# Patient Record
Sex: MALE | Race: OTHER | Employment: FULL TIME | URBAN - METROPOLITAN AREA
[De-identification: names, ages, dates, MRNs, and addresses within clinical notes are randomized per-mention and may not be internally consistent; named-entity substitution may affect disease eponyms.]

---

## 2018-02-16 ENCOUNTER — APPOINTMENT (OUTPATIENT)
Dept: OCCUPATIONAL MEDICINE | Age: 58
End: 2018-02-16
Attending: PEDIATRICS

## 2018-02-20 ENCOUNTER — HOSPITAL ENCOUNTER (OUTPATIENT)
Dept: GENERAL RADIOLOGY | Age: 58
Discharge: HOME | End: 2018-02-20
Attending: PREVENTIVE MEDICINE

## 2018-02-20 ENCOUNTER — OFFICE VISIT (OUTPATIENT)
Dept: OCCUPATIONAL MEDICINE | Age: 58
End: 2018-02-20
Attending: FAMILY MEDICINE

## 2018-02-20 DIAGNOSIS — M54.2 CERVICAL PAIN: Primary | ICD-10-CM

## 2018-02-20 DIAGNOSIS — M25.512 ACUTE PAIN OF LEFT SHOULDER: ICD-10-CM

## 2018-02-20 DIAGNOSIS — M54.2 CERVICAL PAIN: ICD-10-CM

## 2018-02-20 PROCEDURE — 73030 X-RAY EXAM OF SHOULDER: CPT | Performed by: PREVENTIVE MEDICINE

## 2018-02-20 PROCEDURE — 72050 X-RAY EXAM NECK SPINE 4/5VWS: CPT | Performed by: PREVENTIVE MEDICINE

## 2018-02-22 ENCOUNTER — APPOINTMENT (OUTPATIENT)
Dept: OCCUPATIONAL MEDICINE | Age: 58
End: 2018-02-22
Attending: FAMILY MEDICINE

## 2018-02-27 ENCOUNTER — APPOINTMENT (OUTPATIENT)
Dept: OCCUPATIONAL MEDICINE | Age: 58
End: 2018-02-27
Attending: FAMILY MEDICINE

## 2018-03-05 ENCOUNTER — OFFICE VISIT (OUTPATIENT)
Dept: PHYSICAL THERAPY | Age: 58
End: 2018-03-05
Attending: FAMILY MEDICINE

## 2018-03-05 ENCOUNTER — TELEPHONE (OUTPATIENT)
Dept: PHYSICAL THERAPY | Age: 58
End: 2018-03-05

## 2018-03-05 NOTE — PROGRESS NOTES
OCCUPATIONAL HEALTH PHYSICAL THERAPY EVALUATION:   Referring Physician: Dr. Bren Terry  Diagnosis: Radiculopathy of arm (M54.10)  Left shoulder strain (J18.926D)     Date of Service: 3/5/2018   Date of onset/injury: 2/15/2018 No. Of Authorized visits: Lifting   (% of workday)     40  pounds   30   pounds   10  pounds          Physical Demand Characteristics of Work  Physical Demand Level Occasional   0-33% of workday Frequent  34-66% of workday Constant  % of workday   Medium  10 lbs   10 lb posture, HEP. Patient was instructed in and issued a HEP.   Received therapeutic exercises:  - supine neck retraction, rotation  - seated neck retraction, rotation, lateral flexion  - seated postural correction / scap retractions  - standing behind the ba care.    X___________________________________________________ Date____________________    Certification From: 8/9/7409  To:6/3/2018

## 2018-03-06 ENCOUNTER — APPOINTMENT (OUTPATIENT)
Dept: OCCUPATIONAL MEDICINE | Age: 58
End: 2018-03-06
Attending: FAMILY MEDICINE

## 2018-03-07 ENCOUNTER — OFFICE VISIT (OUTPATIENT)
Dept: PHYSICAL THERAPY | Age: 58
End: 2018-03-07
Attending: FAMILY MEDICINE

## 2018-03-07 NOTE — PROGRESS NOTES
Diagnosis: Radiculopathy of arm (M54.10)  Left shoulder strain (T12.436Y)          # of Visits:  2         Next MD visit: none scheduled  Fall Risk: standard         Precautions: n/a          Medication Changes since last visit?: No    Subjective: States h

## 2018-03-12 ENCOUNTER — OFFICE VISIT (OUTPATIENT)
Dept: PHYSICAL THERAPY | Age: 58
End: 2018-03-12
Attending: FAMILY MEDICINE

## 2018-03-12 NOTE — PROGRESS NOTES
Diagnosis: Radiculopathy of arm (M54.10)  Left shoulder strain (L81.283A)          # of Visits:  3         Next MD visit: none scheduled  Fall Risk: standard         Precautions: n/a          Medication Changes since last visit?: No    Subjective: Still co

## 2018-03-13 ENCOUNTER — APPOINTMENT (OUTPATIENT)
Dept: OCCUPATIONAL MEDICINE | Age: 58
End: 2018-03-13
Attending: FAMILY MEDICINE

## 2018-03-14 ENCOUNTER — APPOINTMENT (OUTPATIENT)
Dept: PHYSICAL THERAPY | Age: 58
End: 2018-03-14
Attending: FAMILY MEDICINE

## 2018-03-16 ENCOUNTER — OFFICE VISIT (OUTPATIENT)
Dept: PHYSICAL THERAPY | Age: 58
End: 2018-03-16
Attending: FAMILY MEDICINE

## 2018-03-16 NOTE — PROGRESS NOTES
Patient saw MD yesterday and was referred for MRI and ortho consult due to persistent neck and shoulder pain. PT on hold for now until ortho consult next week.

## 2018-03-17 ENCOUNTER — HOSPITAL ENCOUNTER (OUTPATIENT)
Dept: MRI IMAGING | Age: 58
Discharge: HOME | End: 2018-03-17
Attending: FAMILY MEDICINE
Payer: OTHER MISCELLANEOUS

## 2018-03-17 DIAGNOSIS — S46.919A SHOULDER STRAIN: ICD-10-CM

## 2018-03-17 DIAGNOSIS — M54.10 RADICULOPATHY OF ARM: ICD-10-CM

## 2018-03-17 PROCEDURE — 73221 MRI JOINT UPR EXTREM W/O DYE: CPT | Performed by: FAMILY MEDICINE

## 2018-03-19 ENCOUNTER — APPOINTMENT (OUTPATIENT)
Dept: PHYSICAL THERAPY | Age: 58
End: 2018-03-19
Attending: FAMILY MEDICINE

## 2018-06-30 ENCOUNTER — HOSPITAL ENCOUNTER (OUTPATIENT)
Dept: GENERAL RADIOLOGY | Age: 58
Discharge: HOME | End: 2018-06-30
Attending: FAMILY MEDICINE
Payer: OTHER MISCELLANEOUS

## 2018-06-30 DIAGNOSIS — F17.200 SMOKER: ICD-10-CM

## 2018-06-30 PROCEDURE — 71046 X-RAY EXAM CHEST 2 VIEWS: CPT | Performed by: FAMILY MEDICINE

## 2018-06-30 RX ORDER — IBUPROFEN 800 MG
TABLET ORAL DAILY
COMMUNITY

## 2018-06-30 RX ORDER — LISINOPRIL 10 MG/1
10 TABLET ORAL DAILY
COMMUNITY

## 2018-07-10 ENCOUNTER — HOSPITAL ENCOUNTER (OUTPATIENT)
Facility: HOSPITAL | Age: 58
Setting detail: HOSPITAL OUTPATIENT SURGERY
Discharge: HOME | End: 2018-07-10
Attending: ORTHOPAEDIC SURGERY | Admitting: ORTHOPAEDIC SURGERY
Payer: OTHER MISCELLANEOUS

## 2018-07-10 ENCOUNTER — ANESTHESIA EVENT (OUTPATIENT)
Dept: SURGERY | Facility: HOSPITAL | Age: 58
End: 2018-07-10
Payer: OTHER MISCELLANEOUS

## 2018-07-10 ENCOUNTER — ANESTHESIA (OUTPATIENT)
Dept: SURGERY | Facility: HOSPITAL | Age: 58
End: 2018-07-10
Payer: OTHER MISCELLANEOUS

## 2018-07-10 VITALS
SYSTOLIC BLOOD PRESSURE: 140 MMHG | HEIGHT: 68 IN | BODY MASS INDEX: 24.55 KG/M2 | HEART RATE: 55 BPM | OXYGEN SATURATION: 96 % | RESPIRATION RATE: 16 BRPM | DIASTOLIC BLOOD PRESSURE: 78 MMHG | TEMPERATURE: 97 F | WEIGHT: 162 LBS

## 2018-07-10 PROBLEM — M75.102 ROTATOR CUFF TEAR, LEFT: Status: ACTIVE | Noted: 2018-07-10

## 2018-07-10 PROCEDURE — 99152 MOD SED SAME PHYS/QHP 5/>YRS: CPT | Performed by: ORTHOPAEDIC SURGERY

## 2018-07-10 PROCEDURE — 0RBK4ZZ EXCISION OF LEFT SHOULDER JOINT, PERCUTANEOUS ENDOSCOPIC APPROACH: ICD-10-PCS | Performed by: ORTHOPAEDIC SURGERY

## 2018-07-10 PROCEDURE — 0RNK4ZZ RELEASE LEFT SHOULDER JOINT, PERCUTANEOUS ENDOSCOPIC APPROACH: ICD-10-PCS | Performed by: ORTHOPAEDIC SURGERY

## 2018-07-10 PROCEDURE — 0LQ24ZZ REPAIR LEFT SHOULDER TENDON, PERCUTANEOUS ENDOSCOPIC APPROACH: ICD-10-PCS | Performed by: ORTHOPAEDIC SURGERY

## 2018-07-10 PROCEDURE — 64415 NJX AA&/STRD BRCH PLXS IMG: CPT | Performed by: ORTHOPAEDIC SURGERY

## 2018-07-10 PROCEDURE — 76942 ECHO GUIDE FOR BIOPSY: CPT | Performed by: ORTHOPAEDIC SURGERY

## 2018-07-10 PROCEDURE — 3E0T3BZ INTRODUCTION OF ANESTHETIC AGENT INTO PERIPHERAL NERVES AND PLEXI, PERCUTANEOUS APPROACH: ICD-10-PCS | Performed by: ANESTHESIOLOGY

## 2018-07-10 DEVICE — ANCHOR CORK METAL AR-1928SF-45: Type: IMPLANTABLE DEVICE | Site: SHOULDER | Status: FUNCTIONAL

## 2018-07-10 RX ORDER — HYDROMORPHONE HYDROCHLORIDE 1 MG/ML
0.6 INJECTION, SOLUTION INTRAMUSCULAR; INTRAVENOUS; SUBCUTANEOUS EVERY 5 MIN PRN
Status: DISCONTINUED | OUTPATIENT
Start: 2018-07-10 | End: 2018-07-10

## 2018-07-10 RX ORDER — CEFAZOLIN SODIUM/WATER 2 G/20 ML
2 SYRINGE (ML) INTRAVENOUS ONCE
Status: DISCONTINUED | OUTPATIENT
Start: 2018-07-10 | End: 2018-07-10 | Stop reason: HOSPADM

## 2018-07-10 RX ORDER — OXYCODONE HYDROCHLORIDE AND ACETAMINOPHEN 5; 325 MG/1; MG/1
1 TABLET ORAL EVERY 4 HOURS PRN
Status: DISCONTINUED | OUTPATIENT
Start: 2018-07-10 | End: 2018-07-10

## 2018-07-10 RX ORDER — HYDROCODONE BITARTRATE AND ACETAMINOPHEN 5; 325 MG/1; MG/1
2 TABLET ORAL AS NEEDED
Status: DISCONTINUED | OUTPATIENT
Start: 2018-07-10 | End: 2018-07-10

## 2018-07-10 RX ORDER — DEXAMETHASONE SODIUM PHOSPHATE 4 MG/ML
VIAL (ML) INJECTION AS NEEDED
Status: DISCONTINUED | OUTPATIENT
Start: 2018-07-10 | End: 2018-07-10 | Stop reason: SURG

## 2018-07-10 RX ORDER — HALOPERIDOL 5 MG/ML
0.25 INJECTION INTRAMUSCULAR ONCE AS NEEDED
Status: DISCONTINUED | OUTPATIENT
Start: 2018-07-10 | End: 2018-07-10

## 2018-07-10 RX ORDER — NALOXONE HYDROCHLORIDE 0.4 MG/ML
80 INJECTION, SOLUTION INTRAMUSCULAR; INTRAVENOUS; SUBCUTANEOUS AS NEEDED
Status: DISCONTINUED | OUTPATIENT
Start: 2018-07-10 | End: 2018-07-10

## 2018-07-10 RX ORDER — ROCURONIUM BROMIDE 10 MG/ML
INJECTION, SOLUTION INTRAVENOUS AS NEEDED
Status: DISCONTINUED | OUTPATIENT
Start: 2018-07-10 | End: 2018-07-10 | Stop reason: SURG

## 2018-07-10 RX ORDER — HYDROMORPHONE HYDROCHLORIDE 1 MG/ML
0.2 INJECTION, SOLUTION INTRAMUSCULAR; INTRAVENOUS; SUBCUTANEOUS EVERY 5 MIN PRN
Status: DISCONTINUED | OUTPATIENT
Start: 2018-07-10 | End: 2018-07-10

## 2018-07-10 RX ORDER — DEXAMETHASONE SODIUM PHOSPHATE 10 MG/ML
INJECTION, SOLUTION INTRAMUSCULAR; INTRAVENOUS AS NEEDED
Status: DISCONTINUED | OUTPATIENT
Start: 2018-07-10 | End: 2018-07-10 | Stop reason: SURG

## 2018-07-10 RX ORDER — SODIUM CHLORIDE, SODIUM LACTATE, POTASSIUM CHLORIDE, CALCIUM CHLORIDE 600; 310; 30; 20 MG/100ML; MG/100ML; MG/100ML; MG/100ML
INJECTION, SOLUTION INTRAVENOUS CONTINUOUS
Status: DISCONTINUED | OUTPATIENT
Start: 2018-07-10 | End: 2018-07-10

## 2018-07-10 RX ORDER — ONDANSETRON 2 MG/ML
4 INJECTION INTRAMUSCULAR; INTRAVENOUS ONCE AS NEEDED
Status: DISCONTINUED | OUTPATIENT
Start: 2018-07-10 | End: 2018-07-10

## 2018-07-10 RX ORDER — LIDOCAINE HYDROCHLORIDE 10 MG/ML
INJECTION, SOLUTION EPIDURAL; INFILTRATION; INTRACAUDAL; PERINEURAL AS NEEDED
Status: DISCONTINUED | OUTPATIENT
Start: 2018-07-10 | End: 2018-07-10 | Stop reason: SURG

## 2018-07-10 RX ORDER — HYDROMORPHONE HYDROCHLORIDE 1 MG/ML
0.4 INJECTION, SOLUTION INTRAMUSCULAR; INTRAVENOUS; SUBCUTANEOUS EVERY 5 MIN PRN
Status: DISCONTINUED | OUTPATIENT
Start: 2018-07-10 | End: 2018-07-10

## 2018-07-10 RX ORDER — ROPIVACAINE HYDROCHLORIDE 5 MG/ML
INJECTION, SOLUTION EPIDURAL; INFILTRATION; PERINEURAL AS NEEDED
Status: DISCONTINUED | OUTPATIENT
Start: 2018-07-10 | End: 2018-07-10 | Stop reason: SURG

## 2018-07-10 RX ORDER — LABETALOL HYDROCHLORIDE 5 MG/ML
INJECTION, SOLUTION INTRAVENOUS AS NEEDED
Status: DISCONTINUED | OUTPATIENT
Start: 2018-07-10 | End: 2018-07-10 | Stop reason: SURG

## 2018-07-10 RX ORDER — HYDROCODONE BITARTRATE AND ACETAMINOPHEN 5; 325 MG/1; MG/1
1 TABLET ORAL AS NEEDED
Status: DISCONTINUED | OUTPATIENT
Start: 2018-07-10 | End: 2018-07-10

## 2018-07-10 RX ORDER — FAMOTIDINE 20 MG/1
20 TABLET ORAL ONCE
Status: DISCONTINUED | OUTPATIENT
Start: 2018-07-10 | End: 2018-07-10 | Stop reason: HOSPADM

## 2018-07-10 RX ORDER — ACETAMINOPHEN 500 MG
1000 TABLET ORAL ONCE
Status: COMPLETED | OUTPATIENT
Start: 2018-07-10 | End: 2018-07-10

## 2018-07-10 RX ORDER — ONDANSETRON 2 MG/ML
INJECTION INTRAMUSCULAR; INTRAVENOUS AS NEEDED
Status: DISCONTINUED | OUTPATIENT
Start: 2018-07-10 | End: 2018-07-10 | Stop reason: SURG

## 2018-07-10 RX ORDER — METOCLOPRAMIDE 10 MG/1
10 TABLET ORAL ONCE
Status: DISCONTINUED | OUTPATIENT
Start: 2018-07-10 | End: 2018-07-10 | Stop reason: HOSPADM

## 2018-07-10 RX ORDER — CEFAZOLIN SODIUM/WATER 2 G/20 ML
SYRINGE (ML) INTRAVENOUS AS NEEDED
Status: DISCONTINUED | OUTPATIENT
Start: 2018-07-10 | End: 2018-07-10 | Stop reason: SURG

## 2018-07-10 RX ORDER — MIDAZOLAM HYDROCHLORIDE 1 MG/ML
INJECTION INTRAMUSCULAR; INTRAVENOUS AS NEEDED
Status: DISCONTINUED | OUTPATIENT
Start: 2018-07-10 | End: 2018-07-10 | Stop reason: SURG

## 2018-07-10 RX ADMIN — LIDOCAINE HYDROCHLORIDE 50 MG: 10 INJECTION, SOLUTION EPIDURAL; INFILTRATION; INTRACAUDAL; PERINEURAL at 12:54:00

## 2018-07-10 RX ADMIN — ONDANSETRON 4 MG: 2 INJECTION INTRAMUSCULAR; INTRAVENOUS at 13:43:00

## 2018-07-10 RX ADMIN — CEFAZOLIN SODIUM/WATER 2 G: 2 G/20 ML SYRINGE (ML) INTRAVENOUS at 13:04:00

## 2018-07-10 RX ADMIN — ROCURONIUM BROMIDE 5 MG: 10 INJECTION, SOLUTION INTRAVENOUS at 12:54:00

## 2018-07-10 RX ADMIN — ROPIVACAINE HYDROCHLORIDE 30 ML: 5 INJECTION, SOLUTION EPIDURAL; INFILTRATION; PERINEURAL at 11:27:00

## 2018-07-10 RX ADMIN — SODIUM CHLORIDE, SODIUM LACTATE, POTASSIUM CHLORIDE, CALCIUM CHLORIDE: 600; 310; 30; 20 INJECTION, SOLUTION INTRAVENOUS at 12:48:00

## 2018-07-10 RX ADMIN — DEXAMETHASONE SODIUM PHOSPHATE 4 MG: 10 INJECTION, SOLUTION INTRAMUSCULAR; INTRAVENOUS at 11:27:00

## 2018-07-10 RX ADMIN — LABETALOL HYDROCHLORIDE 5 MG: 5 INJECTION, SOLUTION INTRAVENOUS at 13:58:00

## 2018-07-10 RX ADMIN — DEXAMETHASONE SODIUM PHOSPHATE 4 MG: 4 MG/ML VIAL (ML) INJECTION at 12:58:00

## 2018-07-10 RX ADMIN — LIDOCAINE HYDROCHLORIDE 3 ML: 10 INJECTION, SOLUTION EPIDURAL; INFILTRATION; INTRACAUDAL; PERINEURAL at 11:25:00

## 2018-07-10 RX ADMIN — MIDAZOLAM HYDROCHLORIDE 2 MG: 1 INJECTION INTRAMUSCULAR; INTRAVENOUS at 11:22:00

## 2018-07-10 NOTE — ANESTHESIA POSTPROCEDURE EVALUATION
Patient: Cecilia Ramesh    Procedure Summary     Date:  07/10/18 Room / Location:  [facility] OR  / [facility] OR    Anesthesia Start:  3716 Anesthesia Stop:  7812    Procedure:  SHOULDER ARTHROSCOPY ROTATOR CUFF REPAIR (Left Groin) Diagnosis:  Shannen Peter

## 2018-07-10 NOTE — ADDENDUM NOTE
Addendum  created 07/10/18 1411 by Steve Khan CRNA    Anesthesia Intra Meds edited, Orders acknowledged in Narrator

## 2018-07-10 NOTE — ANESTHESIA PREPROCEDURE EVALUATION
Anesthesia PreOp Note    HPI:     Ania Garza is a 60year old male who presents for preoperative consultation requested by:  Jasmin Abad MD    Date of Surgery: 7/10/2018    Procedure(s):  SHOULDER ARTHROSCOPY ROTATOR CUFF REPAIR  Indication: [facility] Vital Signs: Body mass index is 24.63 kg/m². height is 1.727 m (5' 8\") and weight is 73.5 kg (162 lb). His oral temperature is 98 °F (36.7 °C). His blood pressure is 153/74 and his pulse is 57. His respiration is 15 and oxygen saturation is 97%.

## 2018-07-10 NOTE — ANESTHESIA PROCEDURE NOTES
Airway  Date/Time: 7/10/2018 12:55 PM  Urgency: elective      General Information and Staff    Patient location during procedure: OR  Anesthesiologist: Yuni Wilson  Resident/CRNA: Christina Lazo  Performed: CRNA     Indications and Patient Condition

## 2018-07-10 NOTE — ANESTHESIA PROCEDURE NOTES
Peripheral Block    Anesthesiologist:  Yuni Wilson  Performed by:   Anesthesiologist  Patient Location:  PACU  Start Time:  7/10/2018 11:21 AM  End Time:  7/10/2018 11:27 AM  Site Identification: ultrasound guided, real time ultrasound guided, nerve stimu

## 2018-07-10 NOTE — H&P
[facility] Patient Status:  Hospital Outpatient Surgery    1960 MRN [id number]   Location [facility] UNIT Attending Rusty Vargas MD   Hosp Day # 0 PCP No zahra

## 2018-07-10 NOTE — BRIEF OP NOTE
Pre-Operative Diagnosis: Rotator cuff tear left shoulder     Post-Operative Diagnosis: Rotator cuff tear left shoulder, Impingement, synovitis     Procedure Performed:   Procedure(s):  Left shoulder arthroscopic rotator cuff repair, subacromial decompressi

## 2018-07-21 NOTE — OPERATIVE REPORT
[facility]    PATIENT'S NAME: Carolyn Sanchez   ATTENDING PHYSICIAN: [address] Miguel Florez MD   OPERATING PHYSICIAN: Leandro Richards.  Miguel Florez MD   PATIENT ACCOUNT#:   [id number]    LOCATION:  [address]  MEDICAL RECORD #:   [id number]       DATE OF administered to the patient for prophylactic purposes. The patient was brought into the operating room, placed on the operating table in supine position. General anesthesia was obtained by the anesthesiologist without difficulty.   The patient was reposit Next, we directed our attention to the greater tuberosity. We debrided the greater tuberosity until there was a bleeding bony surface present. Next, we directed our attention to the rotator cuff tear. It was an L-shaped tear.   We decided to perform mar

## 2018-07-24 ENCOUNTER — OFFICE VISIT (OUTPATIENT)
Dept: PHYSICAL THERAPY | Age: 58
End: 2018-07-24
Payer: OTHER MISCELLANEOUS

## 2018-07-24 DIAGNOSIS — S46.919A: ICD-10-CM

## 2018-07-24 PROCEDURE — 97110 THERAPEUTIC EXERCISES: CPT | Performed by: PHYSICAL THERAPIST

## 2018-07-24 PROCEDURE — 97161 PT EVAL LOW COMPLEX 20 MIN: CPT | Performed by: PHYSICAL THERAPIST

## 2018-07-30 ENCOUNTER — OFFICE VISIT (OUTPATIENT)
Dept: PHYSICAL THERAPY | Age: 58
End: 2018-07-30
Payer: OTHER MISCELLANEOUS

## 2018-07-30 PROCEDURE — 97110 THERAPEUTIC EXERCISES: CPT | Performed by: PHYSICAL THERAPIST

## 2018-07-30 NOTE — PROGRESS NOTES
Diagnosis: L Shoulder Rotator Cuff         Next MD visit: none scheduled  Fall Risk: standard         Precautions: n/a          Medication Changes since last visit?: No    Subjective: States his shoulder hurts when he tires to reach his arm overhead.  Falguni

## 2018-08-01 ENCOUNTER — OFFICE VISIT (OUTPATIENT)
Dept: PHYSICAL THERAPY | Age: 58
End: 2018-08-01
Payer: OTHER MISCELLANEOUS

## 2018-08-01 PROCEDURE — 97110 THERAPEUTIC EXERCISES: CPT

## 2018-08-01 NOTE — PROGRESS NOTES
Diagnosis: L Shoulder Rotator Cuff         Next MD visit: 8/9/18  Fall Risk: standard         Precautions: n/a          Medication Changes since last visit?: No    Subjective: Patient reports 6/10 L shoulder pain today.  Patient states pain increases when h

## 2018-08-06 ENCOUNTER — OFFICE VISIT (OUTPATIENT)
Dept: PHYSICAL THERAPY | Age: 58
End: 2018-08-06
Payer: OTHER MISCELLANEOUS

## 2018-08-06 PROCEDURE — 97110 THERAPEUTIC EXERCISES: CPT

## 2018-08-06 NOTE — PROGRESS NOTES
Diagnosis: L Shoulder Rotator Cuff         Next MD visit: 8/9/18  Fall Risk: standard         Precautions: n/a          Medication Changes since last visit?: No    Subjective: Patient reports 4/10 L shoulder pain today.  Patient reported decreased pain afte

## 2018-08-08 ENCOUNTER — OFFICE VISIT (OUTPATIENT)
Dept: PHYSICAL THERAPY | Age: 58
End: 2018-08-08
Payer: OTHER MISCELLANEOUS

## 2018-08-08 PROCEDURE — 97110 THERAPEUTIC EXERCISES: CPT | Performed by: PHYSICAL THERAPIST

## 2018-08-08 NOTE — PROGRESS NOTES
Physical Therapy Treatment and Progress Report    Patient Name: Tomas Jorgensen, MRN: [id number]  Date: 8/8/2018  Referring Physician: Dr. Terri Smith  Diagnosis: Strain of shoulder (D00.822U) S/P Rotator Cuff Repair    Pt has attended 6 visits deviations. 4. Patient will be able to verbalize and demonstrate strategies to improve posture during activity. Rehab Potential: good    Plan: Continue skilled Physcial Therapy 2 x/week or a total of 10 visits over a 90 day period.  Treatment wi

## 2018-08-13 ENCOUNTER — TELEPHONE (OUTPATIENT)
Dept: PHYSICAL THERAPY | Age: 58
End: 2018-08-13

## 2018-08-13 ENCOUNTER — OFFICE VISIT (OUTPATIENT)
Dept: PHYSICAL THERAPY | Age: 58
End: 2018-08-13
Payer: OTHER MISCELLANEOUS

## 2018-08-13 PROCEDURE — 97110 THERAPEUTIC EXERCISES: CPT

## 2018-08-13 NOTE — PROGRESS NOTES
Diagnosis: L Shoulder Rotator Cuff         Next MD visit: 9/6/18  Fall Risk: standard         Precautions: n/a          Medication Changes since last visit?: No    Subjective: Patient reports 4/10 L shoulder pain today.  Patient saw MD last Thursday and to

## 2018-08-16 ENCOUNTER — OFFICE VISIT (OUTPATIENT)
Dept: PHYSICAL THERAPY | Age: 58
End: 2018-08-16
Payer: OTHER MISCELLANEOUS

## 2018-08-16 PROCEDURE — 97110 THERAPEUTIC EXERCISES: CPT | Performed by: PHYSICAL THERAPIST

## 2018-08-16 NOTE — PROGRESS NOTES
Diagnosis: L Shoulder Rotator Cuff         Next MD visit: 9/6/18  Fall Risk: standard         Precautions: n/a          Medication Changes since last visit?: No    Subjective: Patient reports 0/10 L shoulder pain when at rest. States he has been staying in

## 2018-08-20 ENCOUNTER — APPOINTMENT (OUTPATIENT)
Dept: PHYSICAL THERAPY | Age: 58
End: 2018-08-20
Payer: OTHER MISCELLANEOUS

## 2018-08-23 ENCOUNTER — OFFICE VISIT (OUTPATIENT)
Dept: PHYSICAL THERAPY | Age: 58
End: 2018-08-23
Attending: ORTHOPAEDIC SURGERY
Payer: OTHER MISCELLANEOUS

## 2018-08-23 PROCEDURE — 97110 THERAPEUTIC EXERCISES: CPT

## 2018-08-23 NOTE — PROGRESS NOTES
Diagnosis: L Shoulder Rotator Cuff         Next MD visit: 9/6/18  Fall Risk: standard         Precautions: n/a          Medication Changes since last visit?: No    Subjective: Patient reports 3/10 L shoulder pain when at rest. States he has been staying in

## 2018-08-27 ENCOUNTER — APPOINTMENT (OUTPATIENT)
Dept: PHYSICAL THERAPY | Age: 58
End: 2018-08-27
Attending: ORTHOPAEDIC SURGERY
Payer: OTHER MISCELLANEOUS

## 2018-08-29 ENCOUNTER — OFFICE VISIT (OUTPATIENT)
Dept: PHYSICAL THERAPY | Age: 58
End: 2018-08-29
Attending: ORTHOPAEDIC SURGERY
Payer: OTHER MISCELLANEOUS

## 2018-08-29 PROCEDURE — 97110 THERAPEUTIC EXERCISES: CPT | Performed by: PHYSICAL THERAPIST

## 2018-08-29 NOTE — PROGRESS NOTES
Diagnosis: L Shoulder Rotator Cuff         Next MD visit: 9/6/18  Fall Risk: standard         Precautions: n/a          Medication Changes since last visit?: No    Subjective: Patient reports 2/10 L shoulder pain when at rest.       Objective:    L shoulde

## 2018-09-04 ENCOUNTER — OFFICE VISIT (OUTPATIENT)
Dept: PHYSICAL THERAPY | Age: 58
End: 2018-09-04
Attending: ORTHOPAEDIC SURGERY
Payer: OTHER MISCELLANEOUS

## 2018-09-04 PROCEDURE — 97110 THERAPEUTIC EXERCISES: CPT

## 2018-09-06 ENCOUNTER — APPOINTMENT (OUTPATIENT)
Dept: PHYSICAL THERAPY | Age: 58
End: 2018-09-06
Attending: ORTHOPAEDIC SURGERY
Payer: OTHER MISCELLANEOUS

## 2018-09-10 ENCOUNTER — APPOINTMENT (OUTPATIENT)
Dept: PHYSICAL THERAPY | Age: 58
End: 2018-09-10
Attending: ORTHOPAEDIC SURGERY
Payer: OTHER MISCELLANEOUS

## 2018-09-10 PROCEDURE — 97110 THERAPEUTIC EXERCISES: CPT | Performed by: PHYSICAL THERAPIST

## 2018-09-10 NOTE — PROGRESS NOTES
Physical Therapy Treatment and Progress Report    Patient Name: Korin Medina, MRN: [id number]  Date: 9/10/2018  Referring Physician: Dr. Roberto Branch    Diagnosis: L Shoulder Rotator Cuff      Pt has attended 11 visits in Physical Therapy. up with MD regarding progression of PT. Patient/Family/Caregiver was advised of these findings, precautions, and treatment options and has agreed to actively participate in planning and for this course of care.     Thank you for your referral. If you

## 2018-09-12 ENCOUNTER — TELEPHONE (OUTPATIENT)
Dept: PHYSICAL THERAPY | Age: 58
End: 2018-09-12

## 2018-09-12 ENCOUNTER — APPOINTMENT (OUTPATIENT)
Dept: PHYSICAL THERAPY | Age: 58
End: 2018-09-12
Attending: ORTHOPAEDIC SURGERY
Payer: OTHER MISCELLANEOUS

## 2018-09-13 ENCOUNTER — APPOINTMENT (OUTPATIENT)
Dept: PHYSICAL THERAPY | Age: 58
End: 2018-09-13
Attending: ORTHOPAEDIC SURGERY
Payer: OTHER MISCELLANEOUS

## 2018-09-13 ENCOUNTER — TELEPHONE (OUTPATIENT)
Dept: PHYSICAL THERAPY | Age: 58
End: 2018-09-13

## 2018-09-17 ENCOUNTER — APPOINTMENT (OUTPATIENT)
Dept: PHYSICAL THERAPY | Age: 58
End: 2018-09-17
Attending: ORTHOPAEDIC SURGERY
Payer: OTHER MISCELLANEOUS

## 2018-09-19 ENCOUNTER — APPOINTMENT (OUTPATIENT)
Dept: PHYSICAL THERAPY | Age: 58
End: 2018-09-19
Attending: ORTHOPAEDIC SURGERY
Payer: OTHER MISCELLANEOUS

## 2018-09-20 ENCOUNTER — OFFICE VISIT (OUTPATIENT)
Dept: PHYSICAL THERAPY | Age: 58
End: 2018-09-20
Attending: ORTHOPAEDIC SURGERY
Payer: OTHER MISCELLANEOUS

## 2018-09-20 PROCEDURE — 97110 THERAPEUTIC EXERCISES: CPT | Performed by: PHYSICAL THERAPIST

## 2018-09-20 NOTE — PROGRESS NOTES
Physical Therapy Treatment and Progress Report    Patient Name: Anup Solorzano, MRN: [id number]  Date: 9/10/2018  Referring Physician: Dr. Sid Bell    Diagnosis: L Shoulder Rotator Cuff      Pt has attended 12 visits in Physical Therapy. treatment options and has agreed to actively participate in planning and for this course of care. Thank you for your referral. If you have any questions, please contact me at Dept: [phone].     Sincerely,  Electronically signed by therapist: Josue Boggs

## 2018-09-24 ENCOUNTER — APPOINTMENT (OUTPATIENT)
Dept: PHYSICAL THERAPY | Age: 58
End: 2018-09-24
Payer: OTHER MISCELLANEOUS

## 2018-09-24 ENCOUNTER — APPOINTMENT (OUTPATIENT)
Dept: PHYSICAL THERAPY | Age: 58
End: 2018-09-24
Attending: ORTHOPAEDIC SURGERY
Payer: OTHER MISCELLANEOUS

## 2018-09-26 ENCOUNTER — APPOINTMENT (OUTPATIENT)
Dept: PHYSICAL THERAPY | Age: 58
End: 2018-09-26
Attending: ORTHOPAEDIC SURGERY
Payer: OTHER MISCELLANEOUS

## 2018-09-26 ENCOUNTER — APPOINTMENT (OUTPATIENT)
Dept: PHYSICAL THERAPY | Age: 58
End: 2018-09-26
Payer: OTHER MISCELLANEOUS

## 2018-10-01 ENCOUNTER — APPOINTMENT (OUTPATIENT)
Dept: PHYSICAL THERAPY | Age: 58
End: 2018-10-01
Payer: OTHER MISCELLANEOUS

## 2018-10-03 ENCOUNTER — APPOINTMENT (OUTPATIENT)
Dept: PHYSICAL THERAPY | Age: 58
End: 2018-10-03
Payer: OTHER MISCELLANEOUS

## 2018-10-08 ENCOUNTER — APPOINTMENT (OUTPATIENT)
Dept: PHYSICAL THERAPY | Age: 58
End: 2018-10-08
Payer: OTHER MISCELLANEOUS

## 2018-10-11 ENCOUNTER — OFFICE VISIT (OUTPATIENT)
Dept: PHYSICAL THERAPY | Age: 58
End: 2018-10-11
Attending: ORTHOPAEDIC SURGERY
Payer: OTHER MISCELLANEOUS

## 2018-10-11 PROCEDURE — 97110 THERAPEUTIC EXERCISES: CPT

## 2018-10-16 ENCOUNTER — OFFICE VISIT (OUTPATIENT)
Dept: PHYSICAL THERAPY | Age: 58
End: 2018-10-16
Attending: ORTHOPAEDIC SURGERY
Payer: OTHER MISCELLANEOUS

## 2018-10-16 PROCEDURE — 97110 THERAPEUTIC EXERCISES: CPT

## 2018-10-16 NOTE — PROGRESS NOTES
Diagnosis: L Shoulder Rotator Cuff         Next MD visit: 11/1/18  Fall Risk: standard         Precautions: n/a          Medication Changes since last visit?: No    Subjective: Patient reports 1/10 L shoulder pain when at rest. Patient stated he felt good abd/adduction 1# DB x 30 reps AROM  - supine L shoulder ER with AAS with YTB around wrists x 30 reps   - supine L shoulder rhythmic stabilization @ 90 degrees flexion with 2# DB CW/CCW x 30 each (TC's for proper form)  - sidelying L shoulder ER with AAS wi

## 2018-10-23 ENCOUNTER — OFFICE VISIT (OUTPATIENT)
Dept: PHYSICAL THERAPY | Age: 58
End: 2018-10-23
Attending: ORTHOPAEDIC SURGERY
Payer: OTHER MISCELLANEOUS

## 2018-10-23 PROCEDURE — 97110 THERAPEUTIC EXERCISES: CPT

## 2018-10-23 NOTE — PROGRESS NOTES
Diagnosis: L Shoulder Rotator Cuff         Next MD visit: 11/1/18  Fall Risk: standard         Precautions: n/a          Medication Changes since last visit?: No    Subjective: Patient reports 1/10 L shoulder pain when at rest. Patient stated with cold wea horizontal abd/adduction 1# DB x 30 reps AROM  - supine L shoulder ER with AAS with YTB around wrists x 30 reps   - supine L shoulder rhythmic stabilization @ 90 degrees flexion with 2# DB CW/CCW x 30 each (TC's for proper form)  - sidelying L shoulder ER

## 2018-10-25 ENCOUNTER — OFFICE VISIT (OUTPATIENT)
Dept: PHYSICAL THERAPY | Age: 58
End: 2018-10-25
Attending: ORTHOPAEDIC SURGERY
Payer: OTHER MISCELLANEOUS

## 2018-10-25 PROCEDURE — 97110 THERAPEUTIC EXERCISES: CPT | Performed by: PHYSICAL THERAPIST

## 2018-10-25 NOTE — PROGRESS NOTES
Diagnosis: L Shoulder Rotator Cuff         Next MD visit: 11/1/18  Fall Risk: standard         Precautions: n/a          Medication Changes since last visit?: No    Subjective: Patient complains of increased achiness of L shoulder when it gets cold.  Report towel under elbow AAS 3 x 10 with RTB  - standing B shoulder ER with AAS with RTB 2 x 15   - standing B high/middle row x 30 each with [address]  - standing B shoulder extension with GSC 2 x 10 reps   - standing B shoulder extension with straight bar pulley 25# EX 3       Total Timed Treatment: 45 min  Total Treatment Time: 45 min

## 2018-10-30 ENCOUNTER — OFFICE VISIT (OUTPATIENT)
Dept: PHYSICAL THERAPY | Age: 58
End: 2018-10-30
Attending: ORTHOPAEDIC SURGERY
Payer: OTHER MISCELLANEOUS

## 2018-10-30 PROCEDURE — 97110 THERAPEUTIC EXERCISES: CPT

## 2018-10-30 NOTE — PROGRESS NOTES
Diagnosis: L Shoulder Rotator Cuff         Next MD visit: 11/1/18  Fall Risk: standard         Precautions: n/a          Medication Changes since last visit?: No    Subjective: Patient reports 1/10 L shoulder pain today.        Objective:    Standing L shou 2lb DB  - Blue sport cord shoulder rows 10 x 3  - red sport cord shoulder extensions, B shoulder external rotation 10 x 3  - green theraband L shoulder internal and external rotation 10 x 3  - standing lat pull down 40lbs 10 x 3  - incline bench forward pr

## 2018-11-01 ENCOUNTER — OFFICE VISIT (OUTPATIENT)
Dept: PHYSICAL THERAPY | Age: 58
End: 2018-11-01
Attending: ORTHOPAEDIC SURGERY
Payer: OTHER MISCELLANEOUS

## 2018-11-01 PROCEDURE — 97110 THERAPEUTIC EXERCISES: CPT | Performed by: PHYSICAL THERAPIST

## 2018-11-01 NOTE — PROGRESS NOTES
Diagnosis: L Shoulder Rotator Cuff         Next MD visit: 11/1/18  Fall Risk: standard         Precautions: n/a          Medication Changes since last visit?: No    Subjective: Patient states his shoulder is feeling good. Reports no pain at this time.  Stil deg 2 x 10 with YTB  - standing face pull with rope 10# 2 x 10   - standing B shoulder extension (lat pull) with straight bar 40# 10 x 3   - single arm cable rows. high, medium, low with 15 lbs 10 x 2 each  - single arm tricep pull down on cable 15lbs 10 x

## 2018-11-05 ENCOUNTER — OFFICE VISIT (OUTPATIENT)
Dept: PHYSICAL THERAPY | Age: 58
End: 2018-11-05
Attending: ORTHOPAEDIC SURGERY
Payer: OTHER MISCELLANEOUS

## 2018-11-05 PROCEDURE — 97110 THERAPEUTIC EXERCISES: CPT | Performed by: PHYSICAL THERAPIST

## 2018-11-05 NOTE — PROGRESS NOTES
Diagnosis: L Shoulder Rotator Cuff         Next MD visit: 11/1/18  Fall Risk: standard         Precautions: n/a          Medication Changes since last visit?: No    Subjective: Reports no L shoulder pain.  Complains of weakness and difficulty reaching overh internal and external rotation 15 x 3 each  - repeated overhead reach 10 x 3  - standing B shoulder extension (lat pull) with straight bar 40# 10 x 3   - single arm tricep pull down on cable 15lbs 10 x 2   - supine B shoulder flex OH with cane AAROM 3 x 10 cable rows. high, medium, low with 10 - 20lbs 10 x 2  - single arm tricep pull down on cable 15lbs 10 x 2                         Assessment: Still with decreased L UE strength slowly improving with therapeutic exercises.  Demonstrates improved overhead alexandre

## 2018-11-08 ENCOUNTER — OFFICE VISIT (OUTPATIENT)
Dept: PHYSICAL THERAPY | Age: 58
End: 2018-11-08
Attending: ORTHOPAEDIC SURGERY
Payer: OTHER MISCELLANEOUS

## 2018-11-08 PROCEDURE — 97110 THERAPEUTIC EXERCISES: CPT

## 2018-11-08 NOTE — PROGRESS NOTES
Diagnosis: L Shoulder Rotator Cuff         Next MD visit: 11/1/18  Fall Risk: standard         Precautions: n/a          Medication Changes since last visit?: No    Subjective: Reports no L shoulder pain.  Complains of weakness and difficulty reaching overh extension 3 x 10 reps AROM with 3lb DB  - prone L shoulder single arm row with 3lb db 10 x 3  - Blue sport cord high and medium shoulder rows 10 x 3  - standing B shoulder extension with GSC 10 x 3   - standing B shoulder external rotation with [address] 10 x 3

## 2018-11-12 ENCOUNTER — TELEPHONE (OUTPATIENT)
Dept: PHYSICAL THERAPY | Age: 58
End: 2018-11-12

## 2018-11-12 ENCOUNTER — APPOINTMENT (OUTPATIENT)
Dept: PHYSICAL THERAPY | Age: 58
End: 2018-11-12
Attending: ORTHOPAEDIC SURGERY
Payer: OTHER MISCELLANEOUS

## 2018-11-15 ENCOUNTER — OFFICE VISIT (OUTPATIENT)
Dept: PHYSICAL THERAPY | Age: 58
End: 2018-11-15
Attending: ORTHOPAEDIC SURGERY
Payer: OTHER MISCELLANEOUS

## 2018-11-15 PROCEDURE — 97110 THERAPEUTIC EXERCISES: CPT

## 2018-11-15 NOTE — PROGRESS NOTES
Diagnosis: L Shoulder Rotator Cuff         Next MD visit: 11/1/18  Fall Risk: standard         Precautions: n/a          Medication Changes since last visit?: No    Subjective: Reports no L shoulder pain at rest and 1/10 with movement.        Objective: 2  - prone L shoulder extension 2 x 10 reps AROM with 4# DB  - prone L shoulder single arm row with 4# db 10 x 2 sets   - Blue sport cord high and medium shoulder rows 12 reps x 3 sets  - standing B shoulder extension with [address] 12 x 3 sets  - standing B victor m 10 x 3                     Assessment: Focus of treatment rotator cuff strengthening, scapular stabilization, and shoulder ROM to assist with return to PLOF. No pain noted during session. Collaborated with PT patient response to today's treatment.        Pl

## 2018-11-19 ENCOUNTER — OFFICE VISIT (OUTPATIENT)
Dept: PHYSICAL THERAPY | Age: 58
End: 2018-11-19
Attending: ORTHOPAEDIC SURGERY
Payer: OTHER MISCELLANEOUS

## 2018-11-19 PROCEDURE — 97110 THERAPEUTIC EXERCISES: CPT | Performed by: PHYSICAL THERAPIST

## 2018-11-19 NOTE — PROGRESS NOTES
Physical Therapy Treatment and Progress Report    Patient Name: Manuel Jesus, MRN: [id number]  Date: 11/19/2018  Referring Physician: Dr. Rin Ramirez    Diagnosis: L Shoulder Rotator Cuff  Repair    Pt has attended 23 visits in Physical Ther external rotation with 4 # DB 10 x 3  - prone L shoulder extension 3 x 10 reps AROM with 4# DB  - prone L shoulder single arm row with 5# db 10 x 3 sets   - Black sport cord high and medium shoulder rows 10 reps x 2 sets  - standing B shoulder extension wi low row 20# 10 x 2 sets   - standing L shoulder scaption/abduction with YTB 1 x 10 each  - standing face pull with rope 15# 2 x 10 reps   - standing CKC L shoulder flex/diagonal/horziontal abduction with towel at wall x 20 each    - wall push ups 3 x 10 # DB 15 x 2  - sidelying L shoulder external rotation with 4 # DB 10 x 2  - prone L shoulder extension 2 x 10 reps AROM with 4# DB  - prone L shoulder single arm row with 4# db 10 x 2 sets   - Blue sport cord high and medium shoulder rows 12 reps x 3 sets tricep pull down on cable 15lbs 10 x 3  - single arm cable low rows 15lbs 10 x 3                           Charges: EX3       Total Timed Treatment: 40 min  Total Treatment Time: 45 min    Goals     • Therapy Goals      1.  Patient will be independent with

## 2018-11-26 ENCOUNTER — APPOINTMENT (OUTPATIENT)
Dept: PHYSICAL THERAPY | Age: 58
End: 2018-11-26
Attending: ORTHOPAEDIC SURGERY
Payer: OTHER MISCELLANEOUS

## 2018-11-27 ENCOUNTER — APPOINTMENT (OUTPATIENT)
Dept: PHYSICAL THERAPY | Age: 58
End: 2018-11-27
Attending: ORTHOPAEDIC SURGERY
Payer: OTHER MISCELLANEOUS

## 2018-11-29 ENCOUNTER — OFFICE VISIT (OUTPATIENT)
Dept: PHYSICAL THERAPY | Age: 58
End: 2018-11-29
Attending: ORTHOPAEDIC SURGERY
Payer: OTHER MISCELLANEOUS

## 2018-11-29 PROCEDURE — 97530 THERAPEUTIC ACTIVITIES: CPT | Performed by: PHYSICAL THERAPIST

## 2018-11-29 PROCEDURE — 97110 THERAPEUTIC EXERCISES: CPT | Performed by: PHYSICAL THERAPIST

## 2018-11-29 PROCEDURE — 97140 MANUAL THERAPY 1/> REGIONS: CPT | Performed by: PHYSICAL THERAPIST

## 2018-11-29 NOTE — PROGRESS NOTES
Diagnosis: L Shoulder Rotator Cuff         Next MD visit: 11/1/18  Fall Risk: standard         Precautions: n/a          Medication Changes since last visit?: No    Subjective: Reports no L shoulder pain at rest. States his shoulder starts of stiff b with towel under elbow with black t-band 10 x 3  - standing L tricep extension with on pulley x 15lbs 2 x 10   - standing B shoulder extension (lat pull) with straight bar 50# 10 x 2 sets  - L single arm high, low row 20# 10 x 2 sets   - standing L shoulde horizontal abd/adduction x 30 reps AROM with 3# DB  - supine B shoulder press with 7# DB 10 x 3  - supine L shoulder stabilization cw/ccw with 5 # DB 15 x 1  - sidelying L shoulder external rotation with 4 # DB 10 x 3  - prone L shoulder extension 3 x 10 r standing L shoulder IR with AAS with towel under elbow with blue t-band 10 x 3  - standing L tricep extension with blue t-band 3 x 10   - standing L bicep curl 5# DB 3 x 10   - standing B shoulder extension (lat pull) with straight bar 45# 10 x 3 sets  - L program. Patient with residual tightness at end range L shoulder flexion and abduction. Goals     • Therapy Goals      1. Patient will be independent with HEP and its progression.   2. Increase L shoulder ROM to [facility] into all planes to improve UE mobil

## 2018-11-30 ENCOUNTER — APPOINTMENT (OUTPATIENT)
Dept: PHYSICAL THERAPY | Age: 58
End: 2018-11-30
Attending: ORTHOPAEDIC SURGERY
Payer: OTHER MISCELLANEOUS

## 2018-12-03 ENCOUNTER — APPOINTMENT (OUTPATIENT)
Dept: PHYSICAL THERAPY | Age: 58
End: 2018-12-03
Attending: ORTHOPAEDIC SURGERY
Payer: OTHER MISCELLANEOUS

## 2018-12-04 ENCOUNTER — APPOINTMENT (OUTPATIENT)
Dept: PHYSICAL THERAPY | Age: 58
End: 2018-12-04
Attending: ORTHOPAEDIC SURGERY
Payer: OTHER MISCELLANEOUS

## 2018-12-10 ENCOUNTER — TELEPHONE (OUTPATIENT)
Dept: PHYSICAL THERAPY | Age: 58
End: 2018-12-10

## 2018-12-11 ENCOUNTER — APPOINTMENT (OUTPATIENT)
Dept: PHYSICAL THERAPY | Age: 58
End: 2018-12-11
Attending: ORTHOPAEDIC SURGERY
Payer: OTHER MISCELLANEOUS

## 2018-12-13 ENCOUNTER — APPOINTMENT (OUTPATIENT)
Dept: PHYSICAL THERAPY | Age: 58
End: 2018-12-13
Attending: ORTHOPAEDIC SURGERY
Payer: OTHER MISCELLANEOUS

## 2018-12-14 ENCOUNTER — APPOINTMENT (OUTPATIENT)
Dept: PHYSICAL THERAPY | Age: 58
End: 2018-12-14
Attending: ORTHOPAEDIC SURGERY
Payer: OTHER MISCELLANEOUS

## 2018-12-17 ENCOUNTER — APPOINTMENT (OUTPATIENT)
Dept: PHYSICAL THERAPY | Age: 58
End: 2018-12-17
Attending: ORTHOPAEDIC SURGERY
Payer: OTHER MISCELLANEOUS

## 2018-12-18 ENCOUNTER — APPOINTMENT (OUTPATIENT)
Dept: PHYSICAL THERAPY | Age: 58
End: 2018-12-18
Attending: ORTHOPAEDIC SURGERY
Payer: OTHER MISCELLANEOUS

## 2019-01-07 ENCOUNTER — APPOINTMENT (OUTPATIENT)
Dept: PHYSICAL THERAPY | Age: 59
End: 2019-01-07
Attending: ORTHOPAEDIC SURGERY
Payer: OTHER MISCELLANEOUS

## 2019-01-10 ENCOUNTER — APPOINTMENT (OUTPATIENT)
Dept: PHYSICAL THERAPY | Age: 59
End: 2019-01-10
Attending: ORTHOPAEDIC SURGERY
Payer: OTHER MISCELLANEOUS

## 2019-01-14 ENCOUNTER — APPOINTMENT (OUTPATIENT)
Dept: PHYSICAL THERAPY | Age: 59
End: 2019-01-14
Attending: ORTHOPAEDIC SURGERY
Payer: OTHER MISCELLANEOUS

## 2019-01-17 ENCOUNTER — APPOINTMENT (OUTPATIENT)
Dept: PHYSICAL THERAPY | Age: 59
End: 2019-01-17
Attending: ORTHOPAEDIC SURGERY
Payer: OTHER MISCELLANEOUS

## 2021-01-06 NOTE — PROGRESS NOTES
No NEW retinal tears or retinal detachment seen on clinical exam today. Reviewed the signs and symptoms of retinal tear/retinal detachment and the importance of calling for prompt evaluation should there be increasing floaters, new flashing lights, or decreasing peripheral vision in either eye at any time. Observation recommended. P.T. EVALUATION:   Referring Physician: Dr. Emory Krishnamurthy  Diagnosis: L Shoulder Rotator Cuff Repair   Date of Onset: 7/10/2018 Date of Service: 7/24/2018     PATIENT SUMMARY   Kimmy Bolton is a 60year old y/o male who presents to therapy today with compl Complexity, EX1      Total Timed Treatment: 15 min     Total Treatment Time: 40 min         PLAN OF CARE:    Goals: to be met in 8 weeks  1. Patient will be independent with HEP and its progression.   2. Increase L shoulder ROM to [facility] into all planes to imp
